# Patient Record
(demographics unavailable — no encounter records)

---

## 2024-11-12 NOTE — HISTORY OF PRESENT ILLNESS
[de-identified] : Esme Joseph is a 65-year-old female presents to the office for evaluation of her left knee pain.  Patient has been experiencing left knee pain for the last few years, but is worse today.  Pain is located over the posterior knee.  She has tried Tylenol.  She has not tried physical therapy.  Patient has tried an injection about 3 to 4 years ago.  No falls.  No fevers or chills.  History: None

## 2024-11-12 NOTE — REVIEW OF SYSTEMS
[Joint Pain] : joint pain [Negative] : Endocrine [Joint Stiffness] : no joint stiffness [Fever] : no fever [Chills] : no chills

## 2024-11-12 NOTE — HISTORY OF PRESENT ILLNESS
[de-identified] : Esme Joseph is a 65-year-old female presents to the office for evaluation of her left knee pain.  Patient has been experiencing left knee pain for the last few years, but is worse today.  Pain is located over the posterior knee.  She has tried Tylenol.  She has not tried physical therapy.  Patient has tried an injection about 3 to 4 years ago.  No falls.  No fevers or chills.  History: None

## 2024-11-12 NOTE — PHYSICAL EXAM
[de-identified] : Constitutional:  65 year old female, alert and oriented, cooperative, in no acute distress.  HEENT  NC/AT.  Appearance: symmetric  Neck/Back Straight without deformity or instability.  Good ROM.  Chest/Respiratory  Respiratory effort: no intercostal retractions or use of accessory muscles. Nonlabored Breathing  Skin  On inspection, warm and dry without rashes or lesions.  Mental Status:  Judgment, insight: intact Orientation: oriented to time, place, and person  Neurological: Sensory and Motor are grossly intact throughout  Left Knee  Inspection:     Bruising over the medial knee     No Effusion     Non-tender to palpation over tibial tubercle, patella, lateral joint line, and pes insertion.    Tenderness over the medial joint line  Range of Motion: 	Extension - 0 degrees 	Flexion - 120 degrees 	Extensor lag: None  Stability:      Demonstrates no Varus or Valgus instability      Negative Anterior or Posterior drawer.      Negative Lachman's  Patella: stable, tracks well.   Neurologic Exam     Motor intact including 5/5 Extensor Hallucis Longus, 5/5 Flexor Hallucis Longus, 5/5 Tibialis Anterior and 5/5 Gastrocnemius     Sensation Intact to Light Touch including Saphenous, Sural, Superficial Peroneal, Deep Peroneal, Tibial nerve distributions  Vascular Exam     Foot is warm and well perfused with 2+ Dorsalis Pedis Pulse   No pain with range of motion of the bilateral hips or right knee. No lumbar paraspinal muscle tenderness. [de-identified] : XRay:  XRays of the Pelvis (1 View) taken in the office today and discussed with the patient. XRays demonstrate no obvious fracture or dislocation. There is no significant evidence of osteoarthritis or osteophyte formation. (my personal interpretation).  XRay: XRays of the Left Knee (4 Views) taken in the office today and reviewed with the patient. XRays demonstrate tricompartmental joint space narrowing, worse in the medial compartment, with subchondral sclerosis, overlying osteophytes, all consistent with severe osteoarthritis, KL Grade: 3. (my personal interpretation)

## 2024-11-12 NOTE — DISCUSSION/SUMMARY
[de-identified] : Esme Joseph is a 65-year-old female who presents to the office for evaluation of her left knee pain.  X-rays showed left knee osteoarthritis.  Examination showed good left knee range of motion. Discussed with the patient the examination and imaging findings.  Discussed with the patient the management of patient's knee osteoarthritis at this time, including physical therapy, anti-inflammatories, and injections.  Patient was given referral for physical therapy.  Patient was given a prescription for Meloxicam mg daily for 14 days.  Patient will follow-up in 2 months for reevaluation and management.  Patient understanding and in agreement the plan.  All questions answered   Plan: -Physical Therapy -Meloxicam 15mg daily for 14 days -Follow up in 2 months for reevaluation and management

## 2024-11-12 NOTE — HISTORY OF PRESENT ILLNESS
[de-identified] : Esme Joseph is a 65-year-old female presents to the office for evaluation of her left knee pain.  Patient has been experiencing left knee pain for the last few years, but is worse today.  Pain is located over the posterior knee.  She has tried Tylenol.  She has not tried physical therapy.  Patient has tried an injection about 3 to 4 years ago.  No falls.  No fevers or chills.  History: None

## 2024-11-12 NOTE — DISCUSSION/SUMMARY
[de-identified] : Esme Joseph is a 65-year-old female who presents to the office for evaluation of her left knee pain.  X-rays showed left knee osteoarthritis.  Examination showed good left knee range of motion. Discussed with the patient the examination and imaging findings.  Discussed with the patient the management of patient's knee osteoarthritis at this time, including physical therapy, anti-inflammatories, and injections.  Patient was given referral for physical therapy.  Patient was given a prescription for Meloxicam mg daily for 14 days.  Patient will follow-up in 2 months for reevaluation and management.  Patient understanding and in agreement the plan.  All questions answered   Plan: -Physical Therapy -Meloxicam 15mg daily for 14 days -Follow up in 2 months for reevaluation and management

## 2024-11-12 NOTE — PHYSICAL EXAM
[de-identified] : Constitutional:  65 year old female, alert and oriented, cooperative, in no acute distress.  HEENT  NC/AT.  Appearance: symmetric  Neck/Back Straight without deformity or instability.  Good ROM.  Chest/Respiratory  Respiratory effort: no intercostal retractions or use of accessory muscles. Nonlabored Breathing  Skin  On inspection, warm and dry without rashes or lesions.  Mental Status:  Judgment, insight: intact Orientation: oriented to time, place, and person  Neurological: Sensory and Motor are grossly intact throughout  Left Knee  Inspection:     Bruising over the medial knee     No Effusion     Non-tender to palpation over tibial tubercle, patella, lateral joint line, and pes insertion.    Tenderness over the medial joint line  Range of Motion: 	Extension - 0 degrees 	Flexion - 120 degrees 	Extensor lag: None  Stability:      Demonstrates no Varus or Valgus instability      Negative Anterior or Posterior drawer.      Negative Lachman's  Patella: stable, tracks well.   Neurologic Exam     Motor intact including 5/5 Extensor Hallucis Longus, 5/5 Flexor Hallucis Longus, 5/5 Tibialis Anterior and 5/5 Gastrocnemius     Sensation Intact to Light Touch including Saphenous, Sural, Superficial Peroneal, Deep Peroneal, Tibial nerve distributions  Vascular Exam     Foot is warm and well perfused with 2+ Dorsalis Pedis Pulse   No pain with range of motion of the bilateral hips or right knee. No lumbar paraspinal muscle tenderness. [de-identified] : XRay:  XRays of the Pelvis (1 View) taken in the office today and discussed with the patient. XRays demonstrate no obvious fracture or dislocation. There is no significant evidence of osteoarthritis or osteophyte formation. (my personal interpretation).  XRay: XRays of the Left Knee (4 Views) taken in the office today and reviewed with the patient. XRays demonstrate tricompartmental joint space narrowing, worse in the medial compartment, with subchondral sclerosis, overlying osteophytes, all consistent with severe osteoarthritis, KL Grade: 3. (my personal interpretation)

## 2024-11-12 NOTE — PHYSICAL EXAM
[de-identified] : Constitutional:  65 year old female, alert and oriented, cooperative, in no acute distress.  HEENT  NC/AT.  Appearance: symmetric  Neck/Back Straight without deformity or instability.  Good ROM.  Chest/Respiratory  Respiratory effort: no intercostal retractions or use of accessory muscles. Nonlabored Breathing  Skin  On inspection, warm and dry without rashes or lesions.  Mental Status:  Judgment, insight: intact Orientation: oriented to time, place, and person  Neurological: Sensory and Motor are grossly intact throughout  Left Knee  Inspection:     Bruising over the medial knee     No Effusion     Non-tender to palpation over tibial tubercle, patella, lateral joint line, and pes insertion.    Tenderness over the medial joint line  Range of Motion: 	Extension - 0 degrees 	Flexion - 120 degrees 	Extensor lag: None  Stability:      Demonstrates no Varus or Valgus instability      Negative Anterior or Posterior drawer.      Negative Lachman's  Patella: stable, tracks well.   Neurologic Exam     Motor intact including 5/5 Extensor Hallucis Longus, 5/5 Flexor Hallucis Longus, 5/5 Tibialis Anterior and 5/5 Gastrocnemius     Sensation Intact to Light Touch including Saphenous, Sural, Superficial Peroneal, Deep Peroneal, Tibial nerve distributions  Vascular Exam     Foot is warm and well perfused with 2+ Dorsalis Pedis Pulse   No pain with range of motion of the bilateral hips or right knee. No lumbar paraspinal muscle tenderness. [de-identified] : XRay:  XRays of the Pelvis (1 View) taken in the office today and discussed with the patient. XRays demonstrate no obvious fracture or dislocation. There is no significant evidence of osteoarthritis or osteophyte formation. (my personal interpretation).  XRay: XRays of the Left Knee (4 Views) taken in the office today and reviewed with the patient. XRays demonstrate tricompartmental joint space narrowing, worse in the medial compartment, with subchondral sclerosis, overlying osteophytes, all consistent with severe osteoarthritis, KL Grade: 3. (my personal interpretation)

## 2025-06-24 NOTE — PHYSICAL EXAM
[Normal Oropharynx] : the oropharynx was normal [Clear to Auscultation] : lungs were clear to auscultation bilaterally [Normal] : no respiratory distress, lungs were clear to auscultation bilaterally and no accessory muscle use

## 2025-06-24 NOTE — REVIEW OF SYSTEMS
[Postnasal Drip] : postnasal drip [Cough] : cough [Negative] : Cardiovascular [FreeTextEntry6] : dry cough

## 2025-06-24 NOTE — HISTORY OF PRESENT ILLNESS
[FreeTextEntry8] : 66 year old female with PMH of depression, obesity,  Presents for acute visit  Patient presents with complaints of fatigue.  States she has a cold 2 weeks ago but is still coughing  Cough is worse when speaking for prolonged periods.  Described as dry cough throughout the day.   States she has no issues falling asleep and gets adequate hrs of sleep-Still feeling tired in the morning.  Patient does reports snoring at night. Sleep Study 10/24- shows sleep disordered breathing improved with CPAP machine.  States no one reached out to follow up   Also concerns for preDM  Was in [place] and see by doctor- was told to be PreDM and had a fatty liver.  Last A1C 6.0 in August 2024 Patient does go to the gym- does classes 3-4x weekly.  Also has cut down on carbs like bread, rice etc.  Checking sugar at home in AM and afternoon. Miroslava results today.   Requesting GYN referral Ongoing left knee pain- seen by Ortho- prescribed Meloxicam and PT referral.

## 2025-06-24 NOTE — PLAN
[FreeTextEntry1] : 1. Post nasal drip  -cold symptoms have resolved expect cough -Salt water gargles, lozenges and increase hydration  -Flonase sent to pharmacy.   2. Fatigue  -labs ordered  -[facility] with [facility]- may need CPAP machine  -Consider starting Multivitamin.   3. PreDM  -A1C ordered today.   4. HCM -GYN referral provided.